# Patient Record
Sex: FEMALE | Race: WHITE | NOT HISPANIC OR LATINO
[De-identification: names, ages, dates, MRNs, and addresses within clinical notes are randomized per-mention and may not be internally consistent; named-entity substitution may affect disease eponyms.]

---

## 2017-12-07 ENCOUNTER — APPOINTMENT (OUTPATIENT)
Dept: ORTHOPEDIC SURGERY | Facility: CLINIC | Age: 52
End: 2017-12-07
Payer: MEDICARE

## 2017-12-07 VITALS — DIASTOLIC BLOOD PRESSURE: 87 MMHG | HEART RATE: 90 BPM | SYSTOLIC BLOOD PRESSURE: 139 MMHG

## 2017-12-07 VITALS — HEIGHT: 63 IN | BODY MASS INDEX: 32.78 KG/M2 | WEIGHT: 185 LBS

## 2017-12-07 DIAGNOSIS — G89.29 LUMBAGO WITH SCIATICA, LEFT SIDE: ICD-10-CM

## 2017-12-07 DIAGNOSIS — M54.42 LUMBAGO WITH SCIATICA, LEFT SIDE: ICD-10-CM

## 2017-12-07 DIAGNOSIS — F11.20 OPIOID DEPENDENCE, UNCOMPLICATED: ICD-10-CM

## 2017-12-07 DIAGNOSIS — Z87.09 PERSONAL HISTORY OF OTHER DISEASES OF THE RESPIRATORY SYSTEM: ICD-10-CM

## 2017-12-07 DIAGNOSIS — G89.4 CHRONIC PAIN SYNDROME: ICD-10-CM

## 2017-12-07 DIAGNOSIS — M54.41 LUMBAGO WITH SCIATICA, LEFT SIDE: ICD-10-CM

## 2017-12-07 PROCEDURE — 99204 OFFICE O/P NEW MOD 45 MIN: CPT

## 2017-12-07 PROCEDURE — 72100 X-RAY EXAM L-S SPINE 2/3 VWS: CPT

## 2017-12-07 RX ORDER — ZOLPIDEM TARTRATE 5 MG/1
TABLET ORAL
Refills: 0 | Status: ACTIVE | COMMUNITY

## 2017-12-07 RX ORDER — SUMATRIPTAN 20 MG/1
SPRAY NASAL
Refills: 0 | Status: ACTIVE | COMMUNITY

## 2017-12-07 RX ORDER — ACETAMINOPHEN 500 MG/1
TABLET ORAL
Refills: 0 | Status: ACTIVE | COMMUNITY

## 2017-12-07 RX ORDER — FAMOTIDINE 10 MG/1
TABLET, FILM COATED ORAL
Refills: 0 | Status: ACTIVE | COMMUNITY

## 2017-12-28 ENCOUNTER — APPOINTMENT (OUTPATIENT)
Dept: ORTHOPEDIC SURGERY | Facility: CLINIC | Age: 52
End: 2017-12-28

## 2018-02-06 ENCOUNTER — APPOINTMENT (OUTPATIENT)
Dept: SURGERY | Facility: CLINIC | Age: 53
End: 2018-02-06
Payer: MEDICARE

## 2018-02-06 VITALS
WEIGHT: 196.5 LBS | OXYGEN SATURATION: 98 % | SYSTOLIC BLOOD PRESSURE: 119 MMHG | HEIGHT: 62 IN | BODY MASS INDEX: 36.16 KG/M2 | DIASTOLIC BLOOD PRESSURE: 76 MMHG | HEART RATE: 67 BPM | TEMPERATURE: 97.6 F

## 2018-02-06 DIAGNOSIS — Z78.9 OTHER SPECIFIED HEALTH STATUS: ICD-10-CM

## 2018-02-06 PROCEDURE — 99204 OFFICE O/P NEW MOD 45 MIN: CPT

## 2018-07-10 ENCOUNTER — APPOINTMENT (OUTPATIENT)
Dept: SURGERY | Facility: CLINIC | Age: 53
End: 2018-07-10

## 2018-08-14 ENCOUNTER — APPOINTMENT (OUTPATIENT)
Dept: SURGERY | Facility: CLINIC | Age: 53
End: 2018-08-14
Payer: MEDICARE

## 2018-08-14 VITALS
TEMPERATURE: 97.9 F | SYSTOLIC BLOOD PRESSURE: 113 MMHG | OXYGEN SATURATION: 98 % | HEIGHT: 62 IN | BODY MASS INDEX: 36.25 KG/M2 | HEART RATE: 73 BPM | WEIGHT: 197 LBS | DIASTOLIC BLOOD PRESSURE: 79 MMHG

## 2018-08-14 DIAGNOSIS — Z72.3 LACK OF PHYSICAL EXERCISE: ICD-10-CM

## 2018-08-14 DIAGNOSIS — Z60.2 PROBLEMS RELATED TO LIVING ALONE: ICD-10-CM

## 2018-08-14 DIAGNOSIS — Z78.9 OTHER SPECIFIED HEALTH STATUS: ICD-10-CM

## 2018-08-14 PROCEDURE — 99213 OFFICE O/P EST LOW 20 MIN: CPT

## 2018-08-14 SDOH — SOCIAL STABILITY - SOCIAL INSECURITY: PROBLEMS RELATED TO LIVING ALONE: Z60.2

## 2018-09-17 ENCOUNTER — EMERGENCY (EMERGENCY)
Facility: HOSPITAL | Age: 53
LOS: 1 days | Discharge: HOME | End: 2018-09-17
Attending: STUDENT IN AN ORGANIZED HEALTH CARE EDUCATION/TRAINING PROGRAM
Payer: MEDICARE

## 2018-09-17 VITALS
DIASTOLIC BLOOD PRESSURE: 72 MMHG | OXYGEN SATURATION: 98 % | HEART RATE: 85 BPM | SYSTOLIC BLOOD PRESSURE: 109 MMHG | TEMPERATURE: 98 F | RESPIRATION RATE: 18 BRPM

## 2018-09-17 VITALS
OXYGEN SATURATION: 99 % | SYSTOLIC BLOOD PRESSURE: 112 MMHG | TEMPERATURE: 98 F | HEART RATE: 88 BPM | RESPIRATION RATE: 18 BRPM | DIASTOLIC BLOOD PRESSURE: 72 MMHG

## 2018-09-17 LAB
APPEARANCE UR: CLEAR — SIGNIFICANT CHANGE UP
BILIRUB UR-MCNC: NEGATIVE — SIGNIFICANT CHANGE UP
COLOR SPEC: YELLOW — SIGNIFICANT CHANGE UP
DIFF PNL FLD: ABNORMAL
GLUCOSE UR QL: NEGATIVE — SIGNIFICANT CHANGE UP
KETONES UR-MCNC: NEGATIVE — SIGNIFICANT CHANGE UP
LEUKOCYTE ESTERASE UR-ACNC: ABNORMAL
NITRITE UR-MCNC: NEGATIVE — SIGNIFICANT CHANGE UP
PH UR: 7 — SIGNIFICANT CHANGE UP (ref 5–8)
PROT UR-MCNC: 15
SP GR SPEC: 1.01 — SIGNIFICANT CHANGE UP (ref 1.01–1.02)
UROBILINOGEN FLD QL: NEGATIVE — SIGNIFICANT CHANGE UP

## 2018-09-17 PROCEDURE — 29515 APPLICATION SHORT LEG SPLINT: CPT

## 2018-09-17 PROCEDURE — 73630 X-RAY EXAM OF FOOT: CPT

## 2018-09-17 PROCEDURE — 73610 X-RAY EXAM OF ANKLE: CPT

## 2018-09-17 PROCEDURE — 73610 X-RAY EXAM OF ANKLE: CPT | Mod: 26,RT

## 2018-09-17 PROCEDURE — 73562 X-RAY EXAM OF KNEE 3: CPT | Mod: 26,RT

## 2018-09-17 PROCEDURE — 87086 URINE CULTURE/COLONY COUNT: CPT

## 2018-09-17 PROCEDURE — 99284 EMERGENCY DEPT VISIT MOD MDM: CPT | Mod: 25

## 2018-09-17 PROCEDURE — 87186 SC STD MICRODIL/AGAR DIL: CPT

## 2018-09-17 PROCEDURE — 29515 APPLICATION SHORT LEG SPLINT: CPT | Mod: RT

## 2018-09-17 PROCEDURE — 81001 URINALYSIS AUTO W/SCOPE: CPT

## 2018-09-17 PROCEDURE — 73562 X-RAY EXAM OF KNEE 3: CPT

## 2018-09-17 PROCEDURE — 73630 X-RAY EXAM OF FOOT: CPT | Mod: 26,RT

## 2018-09-17 RX ORDER — CEPHALEXIN 500 MG
500 CAPSULE ORAL ONCE
Qty: 0 | Refills: 0 | Status: COMPLETED | OUTPATIENT
Start: 2018-09-17 | End: 2018-09-17

## 2018-09-17 RX ORDER — CEPHALEXIN 500 MG
1 CAPSULE ORAL
Qty: 14 | Refills: 0 | OUTPATIENT
Start: 2018-09-17 | End: 2018-09-23

## 2018-09-17 RX ADMIN — Medication 500 MILLIGRAM(S): at 20:32

## 2018-09-17 NOTE — ED PROVIDER NOTE - OBJECTIVE STATEMENT
53 y/o F with PMHx of HTN and no significant PSHx presents to the ED with complaints of right foot pain s/p fall. Patient fell yesterday and was seen at urgent care. Patient had X-ray done at that time and orthopedic shoe placed. Patient states she was advised to present to ED for further evaluation yesterday. Patient reports unrelated urinary frequency and dysuria. Denies numbness, weakness, tingling or any other complaints. NKDA. Patient states she last took medications for pain last night.

## 2018-09-17 NOTE — ED PROVIDER NOTE - CARE PLAN
Principal Discharge DX:	Urinary tract infection without hematuria, site unspecified Principal Discharge DX:	Closed nondisplaced fracture of fifth metatarsal bone of right foot, initial encounter

## 2018-09-17 NOTE — ED ADULT NURSE NOTE - OBJECTIVE STATEMENT
pt is here for s/p fall.  c/o right foot pain 8/10, pt stated that pt fell down, right pain foot pain, denied LOC, denied dizziness or sob, pt calm at this time, c/o frequent urination at this time.

## 2018-09-17 NOTE — ED PROVIDER NOTE - PRINCIPAL DIAGNOSIS
Urinary tract infection without hematuria, site unspecified Closed nondisplaced fracture of fifth metatarsal bone of right foot, initial encounter

## 2018-09-17 NOTE — ED PROVIDER NOTE - MEDICAL DECISION MAKING DETAILS
51 y/o F presents with right foot pain. Patient is ambulatory in the ED and neurovascularly intact, Will obtain X-ray of the foot, ankle and knee to rule out fracture or dislocation.

## 2018-09-19 ENCOUNTER — OTHER (OUTPATIENT)
Age: 53
End: 2018-09-19

## 2018-10-19 ENCOUNTER — APPOINTMENT (OUTPATIENT)
Dept: RADIOLOGY | Facility: HOSPITAL | Age: 53
End: 2018-10-19

## 2018-11-12 ENCOUNTER — OUTPATIENT (OUTPATIENT)
Dept: OUTPATIENT SERVICES | Facility: HOSPITAL | Age: 53
LOS: 1 days | End: 2018-11-12
Payer: MEDICARE

## 2018-11-12 ENCOUNTER — APPOINTMENT (OUTPATIENT)
Dept: RADIOLOGY | Facility: HOSPITAL | Age: 53
End: 2018-11-12
Payer: MEDICARE

## 2018-11-12 PROCEDURE — 74241: CPT

## 2018-11-12 PROCEDURE — 74241: CPT | Mod: 26

## 2018-11-29 ENCOUNTER — APPOINTMENT (OUTPATIENT)
Dept: SURGERY | Facility: CLINIC | Age: 53
End: 2018-11-29
Payer: MEDICARE

## 2018-11-29 PROCEDURE — ZZZZZ: CPT

## 2019-01-22 ENCOUNTER — OUTPATIENT (OUTPATIENT)
Dept: OUTPATIENT SERVICES | Facility: HOSPITAL | Age: 54
LOS: 1 days | End: 2019-01-22
Payer: MEDICARE

## 2019-01-22 DIAGNOSIS — Z01.818 ENCOUNTER FOR OTHER PREPROCEDURAL EXAMINATION: ICD-10-CM

## 2019-01-22 LAB
ALBUMIN SERPL ELPH-MCNC: 4.3 G/DL — SIGNIFICANT CHANGE UP (ref 3.3–5)
ALP SERPL-CCNC: 78 U/L — SIGNIFICANT CHANGE UP (ref 40–120)
ALT FLD-CCNC: 14 U/L — SIGNIFICANT CHANGE UP (ref 10–45)
ANION GAP SERPL CALC-SCNC: 11 MMOL/L — SIGNIFICANT CHANGE UP (ref 5–17)
APTT BLD: 26.6 SEC — LOW (ref 27.5–36.3)
AST SERPL-CCNC: 13 U/L — SIGNIFICANT CHANGE UP (ref 10–40)
BASOPHILS NFR BLD AUTO: 0.1 % — SIGNIFICANT CHANGE UP (ref 0–2)
BILIRUB SERPL-MCNC: 0.3 MG/DL — SIGNIFICANT CHANGE UP (ref 0.2–1.2)
BUN SERPL-MCNC: 10 MG/DL — SIGNIFICANT CHANGE UP (ref 7–23)
CALCIUM SERPL-MCNC: 9.4 MG/DL — SIGNIFICANT CHANGE UP (ref 8.4–10.5)
CHLORIDE SERPL-SCNC: 108 MMOL/L — SIGNIFICANT CHANGE UP (ref 96–108)
CO2 SERPL-SCNC: 25 MMOL/L — SIGNIFICANT CHANGE UP (ref 22–31)
CREAT SERPL-MCNC: 0.61 MG/DL — SIGNIFICANT CHANGE UP (ref 0.5–1.3)
EOSINOPHIL NFR BLD AUTO: 0.1 % — SIGNIFICANT CHANGE UP (ref 0–6)
GLUCOSE SERPL-MCNC: 96 MG/DL — SIGNIFICANT CHANGE UP (ref 70–99)
HCT VFR BLD CALC: 40.3 % — SIGNIFICANT CHANGE UP (ref 34.5–45)
HGB BLD-MCNC: 13.5 G/DL — SIGNIFICANT CHANGE UP (ref 11.5–15.5)
INR BLD: 1.07 — SIGNIFICANT CHANGE UP (ref 0.88–1.16)
LYMPHOCYTES # BLD AUTO: 18.5 % — SIGNIFICANT CHANGE UP (ref 13–44)
MCHC RBC-ENTMCNC: 30.1 PG — SIGNIFICANT CHANGE UP (ref 27–34)
MCHC RBC-ENTMCNC: 33.5 G/DL — SIGNIFICANT CHANGE UP (ref 32–36)
MCV RBC AUTO: 90 FL — SIGNIFICANT CHANGE UP (ref 80–100)
MONOCYTES NFR BLD AUTO: 7 % — SIGNIFICANT CHANGE UP (ref 2–14)
NEUTROPHILS NFR BLD AUTO: 74.3 % — SIGNIFICANT CHANGE UP (ref 43–77)
PLATELET # BLD AUTO: 342 K/UL — SIGNIFICANT CHANGE UP (ref 150–400)
POTASSIUM SERPL-MCNC: 4.5 MMOL/L — SIGNIFICANT CHANGE UP (ref 3.5–5.3)
POTASSIUM SERPL-SCNC: 4.5 MMOL/L — SIGNIFICANT CHANGE UP (ref 3.5–5.3)
PROT SERPL-MCNC: 6.9 G/DL — SIGNIFICANT CHANGE UP (ref 6–8.3)
PROTHROM AB SERPL-ACNC: 12.1 SEC — SIGNIFICANT CHANGE UP (ref 10–12.9)
RBC # BLD: 4.48 M/UL — SIGNIFICANT CHANGE UP (ref 3.8–5.2)
RBC # FLD: 12.4 % — SIGNIFICANT CHANGE UP (ref 10.3–16.9)
SODIUM SERPL-SCNC: 144 MMOL/L — SIGNIFICANT CHANGE UP (ref 135–145)
WBC # BLD: 10.8 K/UL — HIGH (ref 3.8–10.5)
WBC # FLD AUTO: 10.8 K/UL — HIGH (ref 3.8–10.5)

## 2019-01-22 PROCEDURE — 85025 COMPLETE CBC W/AUTO DIFF WBC: CPT

## 2019-01-22 PROCEDURE — 71046 X-RAY EXAM CHEST 2 VIEWS: CPT | Mod: 26

## 2019-01-22 PROCEDURE — 71046 X-RAY EXAM CHEST 2 VIEWS: CPT

## 2019-01-22 PROCEDURE — 85730 THROMBOPLASTIN TIME PARTIAL: CPT

## 2019-01-22 PROCEDURE — 85610 PROTHROMBIN TIME: CPT

## 2019-01-22 PROCEDURE — 80053 COMPREHEN METABOLIC PANEL: CPT

## 2019-01-23 VITALS
RESPIRATION RATE: 16 BRPM | OXYGEN SATURATION: 98 % | WEIGHT: 200.18 LBS | SYSTOLIC BLOOD PRESSURE: 101 MMHG | HEIGHT: 63 IN | HEART RATE: 74 BPM | DIASTOLIC BLOOD PRESSURE: 61 MMHG | TEMPERATURE: 97 F

## 2019-01-23 NOTE — PATIENT PROFILE ADULT - NSPROGENSOURCEINFO_GEN_A_NUR
DISPLAY PLAN FREE TEXT
patient

## 2019-01-24 ENCOUNTER — INPATIENT (INPATIENT)
Facility: HOSPITAL | Age: 54
LOS: 1 days | Discharge: HOME | DRG: 621 | End: 2019-01-26
Attending: SURGERY | Admitting: SURGERY
Payer: MEDICARE

## 2019-01-24 ENCOUNTER — APPOINTMENT (OUTPATIENT)
Age: 54
End: 2019-01-24

## 2019-01-24 DIAGNOSIS — Z98.891 HISTORY OF UTERINE SCAR FROM PREVIOUS SURGERY: Chronic | ICD-10-CM

## 2019-01-24 DIAGNOSIS — Z41.9 ENCOUNTER FOR PROCEDURE FOR PURPOSES OTHER THAN REMEDYING HEALTH STATE, UNSPECIFIED: Chronic | ICD-10-CM

## 2019-01-24 LAB
BASOPHILS NFR BLD AUTO: 0.1 % — SIGNIFICANT CHANGE UP (ref 0–2)
HCT VFR BLD CALC: 37 % — SIGNIFICANT CHANGE UP (ref 34.5–45)
HGB BLD-MCNC: 12.2 G/DL — SIGNIFICANT CHANGE UP (ref 11.5–15.5)
LYMPHOCYTES # BLD AUTO: 7.9 % — LOW (ref 13–44)
MCHC RBC-ENTMCNC: 30 PG — SIGNIFICANT CHANGE UP (ref 27–34)
MCHC RBC-ENTMCNC: 33 G/DL — SIGNIFICANT CHANGE UP (ref 32–36)
MCV RBC AUTO: 90.9 FL — SIGNIFICANT CHANGE UP (ref 80–100)
MONOCYTES NFR BLD AUTO: 7 % — SIGNIFICANT CHANGE UP (ref 2–14)
NEUTROPHILS NFR BLD AUTO: 85 % — HIGH (ref 43–77)
PLATELET # BLD AUTO: 279 K/UL — SIGNIFICANT CHANGE UP (ref 150–400)
RBC # BLD: 4.07 M/UL — SIGNIFICANT CHANGE UP (ref 3.8–5.2)
RBC # FLD: 12.6 % — SIGNIFICANT CHANGE UP (ref 10.3–16.9)
WBC # BLD: 12.8 K/UL — HIGH (ref 3.8–10.5)
WBC # FLD AUTO: 12.8 K/UL — HIGH (ref 3.8–10.5)

## 2019-01-24 PROCEDURE — 43644 LAP GASTRIC BYPASS/ROUX-EN-Y: CPT | Mod: 80

## 2019-01-24 PROCEDURE — 43645 LAP GASTR BYPASS INCL SMLL I: CPT | Mod: GC

## 2019-01-24 PROCEDURE — 43645 LAP GASTR BYPASS INCL SMLL I: CPT | Mod: 80

## 2019-01-24 RX ORDER — ENOXAPARIN SODIUM 100 MG/ML
40 INJECTION SUBCUTANEOUS EVERY 24 HOURS
Qty: 0 | Refills: 0 | Status: DISCONTINUED | OUTPATIENT
Start: 2019-01-24 | End: 2019-01-26

## 2019-01-24 RX ORDER — METOCLOPRAMIDE HCL 10 MG
10 TABLET ORAL ONCE
Qty: 0 | Refills: 0 | Status: DISCONTINUED | OUTPATIENT
Start: 2019-01-24 | End: 2019-01-26

## 2019-01-24 RX ORDER — HYDROMORPHONE HYDROCHLORIDE 2 MG/ML
1 INJECTION INTRAMUSCULAR; INTRAVENOUS; SUBCUTANEOUS
Qty: 0 | Refills: 0 | Status: DISCONTINUED | OUTPATIENT
Start: 2019-01-24 | End: 2019-01-26

## 2019-01-24 RX ORDER — PANTOPRAZOLE SODIUM 20 MG/1
40 TABLET, DELAYED RELEASE ORAL EVERY 24 HOURS
Qty: 0 | Refills: 0 | Status: DISCONTINUED | OUTPATIENT
Start: 2019-01-24 | End: 2019-01-26

## 2019-01-24 RX ORDER — ONDANSETRON 8 MG/1
4 TABLET, FILM COATED ORAL EVERY 6 HOURS
Qty: 0 | Refills: 0 | Status: DISCONTINUED | OUTPATIENT
Start: 2019-01-24 | End: 2019-01-26

## 2019-01-24 RX ORDER — HYDROMORPHONE HYDROCHLORIDE 2 MG/ML
0.5 INJECTION INTRAMUSCULAR; INTRAVENOUS; SUBCUTANEOUS
Qty: 0 | Refills: 0 | Status: DISCONTINUED | OUTPATIENT
Start: 2019-01-24 | End: 2019-01-24

## 2019-01-24 RX ORDER — ENOXAPARIN SODIUM 100 MG/ML
40 INJECTION SUBCUTANEOUS ONCE
Qty: 0 | Refills: 0 | Status: COMPLETED | OUTPATIENT
Start: 2019-01-24 | End: 2019-01-24

## 2019-01-24 RX ORDER — SODIUM CHLORIDE 9 MG/ML
1000 INJECTION, SOLUTION INTRAVENOUS
Qty: 0 | Refills: 0 | Status: DISCONTINUED | OUTPATIENT
Start: 2019-01-24 | End: 2019-01-26

## 2019-01-24 RX ORDER — SCOPALAMINE 1 MG/3D
1 PATCH, EXTENDED RELEASE TRANSDERMAL ONCE
Qty: 0 | Refills: 0 | Status: COMPLETED | OUTPATIENT
Start: 2019-01-24 | End: 2019-01-24

## 2019-01-24 RX ORDER — HYDRALAZINE HCL 50 MG
10 TABLET ORAL ONCE
Qty: 0 | Refills: 0 | Status: DISCONTINUED | OUTPATIENT
Start: 2019-01-24 | End: 2019-01-24

## 2019-01-24 RX ORDER — HYDROMORPHONE HYDROCHLORIDE 2 MG/ML
0.5 INJECTION INTRAMUSCULAR; INTRAVENOUS; SUBCUTANEOUS
Qty: 0 | Refills: 0 | Status: DISCONTINUED | OUTPATIENT
Start: 2019-01-24 | End: 2019-01-26

## 2019-01-24 RX ADMIN — HYDROMORPHONE HYDROCHLORIDE 1 MILLIGRAM(S): 2 INJECTION INTRAMUSCULAR; INTRAVENOUS; SUBCUTANEOUS at 23:28

## 2019-01-24 RX ADMIN — SCOPALAMINE 1 PATCH: 1 PATCH, EXTENDED RELEASE TRANSDERMAL at 07:28

## 2019-01-24 RX ADMIN — ENOXAPARIN SODIUM 40 MILLIGRAM(S): 100 INJECTION SUBCUTANEOUS at 07:28

## 2019-01-24 RX ADMIN — HYDROMORPHONE HYDROCHLORIDE 0.5 MILLIGRAM(S): 2 INJECTION INTRAMUSCULAR; INTRAVENOUS; SUBCUTANEOUS at 13:45

## 2019-01-24 RX ADMIN — HYDROMORPHONE HYDROCHLORIDE 0.5 MILLIGRAM(S): 2 INJECTION INTRAMUSCULAR; INTRAVENOUS; SUBCUTANEOUS at 13:27

## 2019-01-24 RX ADMIN — PANTOPRAZOLE SODIUM 40 MILLIGRAM(S): 20 TABLET, DELAYED RELEASE ORAL at 13:26

## 2019-01-24 NOTE — PROGRESS NOTE ADULT - ASSESSMENT
53F PMH MO (BMI 36), HTN, HLD, TERRY (on CPAP), chronic pain on opioids s/p robotic gastric bypass    - NPO/IVF  - Pain/nausea PRN  - OOBA/SCDs/Lovenox  - Reyes  - CPAP  - UGI series 01/25

## 2019-01-24 NOTE — PROGRESS NOTE ADULT - SUBJECTIVE AND OBJECTIVE BOX
POST-OPERATIVE NOTE    Procedure: Robotic assisted gastric bypass    Diagnosis/Indication: Morbid obesity     Surgeon: Dr. Gama     S: Pt has no complaints. Denies CP, SOB, BRUNO, calf tenderness. Pain controlled with medication.    O:  T(C): 37.1 (01-24-19 @ 12:42), Max: 37.1 (01-24-19 @ 12:42)  T(F): 98.8 (01-24-19 @ 12:42), Max: 98.8 (01-24-19 @ 12:42)  HR: 102 (01-24-19 @ 15:42) (96 - 120)  BP: 98/51 (01-24-19 @ 15:42) (98/51 - 117/78)  RR: 12 (01-24-19 @ 15:42) (12 - 25)  SpO2: 96% (01-24-19 @ 15:42) (95% - 98%)  Wt(kg): --            Gen: NAD, resting comfortably in bed  C/V: NSR  Pulm: Nonlabored breathing, no respiratory distress  Abd:  Soft, non- distended, TTP around incision site, incision clean dry and intact   Extrem: WWP, no calf edema, SCDs in place

## 2019-01-24 NOTE — H&P ADULT - HISTORY OF PRESENT ILLNESS
54 yo F with morbid obesity - BMI 36 - and HTN, hypercholesterolemia, TERRY-CPAP and chronic back pain, scheduled for gastric bypass today

## 2019-01-24 NOTE — BRIEF OPERATIVE NOTE - OPERATION/FINDINGS
Pouch created over 32 Fr bougie. GJ done in two layers running with 2-0 Quill PDO outer layer and 2-0 Ethibond inner layer. Mesenteric defects closed with non-absorbable suture. BP limb 150 cm, Iván limb 150 cm.

## 2019-01-24 NOTE — BRIEF OPERATIVE NOTE - PROCEDURE
<<-----Click on this checkbox to enter Procedure Creation, bypass, gastric, robot-assisted  01/24/2019    Active  BRITTANY

## 2019-01-25 LAB
ANION GAP SERPL CALC-SCNC: 9 MMOL/L — SIGNIFICANT CHANGE UP (ref 5–17)
BASOPHILS NFR BLD AUTO: 0.1 % — SIGNIFICANT CHANGE UP (ref 0–2)
BUN SERPL-MCNC: 11 MG/DL — SIGNIFICANT CHANGE UP (ref 7–23)
CALCIUM SERPL-MCNC: 8.4 MG/DL — SIGNIFICANT CHANGE UP (ref 8.4–10.5)
CHLORIDE SERPL-SCNC: 105 MMOL/L — SIGNIFICANT CHANGE UP (ref 96–108)
CO2 SERPL-SCNC: 27 MMOL/L — SIGNIFICANT CHANGE UP (ref 22–31)
CREAT SERPL-MCNC: 0.63 MG/DL — SIGNIFICANT CHANGE UP (ref 0.5–1.3)
EOSINOPHIL NFR BLD AUTO: 0.3 % — SIGNIFICANT CHANGE UP (ref 0–6)
GLUCOSE SERPL-MCNC: 90 MG/DL — SIGNIFICANT CHANGE UP (ref 70–99)
HCT VFR BLD CALC: 37 % — SIGNIFICANT CHANGE UP (ref 34.5–45)
HGB BLD-MCNC: 11.9 G/DL — SIGNIFICANT CHANGE UP (ref 11.5–15.5)
LYMPHOCYTES # BLD AUTO: 22.7 % — SIGNIFICANT CHANGE UP (ref 13–44)
MAGNESIUM SERPL-MCNC: 2 MG/DL — SIGNIFICANT CHANGE UP (ref 1.6–2.6)
MCHC RBC-ENTMCNC: 29.5 PG — SIGNIFICANT CHANGE UP (ref 27–34)
MCHC RBC-ENTMCNC: 32.2 G/DL — SIGNIFICANT CHANGE UP (ref 32–36)
MCV RBC AUTO: 91.6 FL — SIGNIFICANT CHANGE UP (ref 80–100)
MONOCYTES NFR BLD AUTO: 8.4 % — SIGNIFICANT CHANGE UP (ref 2–14)
NEUTROPHILS NFR BLD AUTO: 68.5 % — SIGNIFICANT CHANGE UP (ref 43–77)
PHOSPHATE SERPL-MCNC: 3.2 MG/DL — SIGNIFICANT CHANGE UP (ref 2.5–4.5)
PLATELET # BLD AUTO: 242 K/UL — SIGNIFICANT CHANGE UP (ref 150–400)
POTASSIUM SERPL-MCNC: 3.8 MMOL/L — SIGNIFICANT CHANGE UP (ref 3.5–5.3)
POTASSIUM SERPL-SCNC: 3.8 MMOL/L — SIGNIFICANT CHANGE UP (ref 3.5–5.3)
RBC # BLD: 4.04 M/UL — SIGNIFICANT CHANGE UP (ref 3.8–5.2)
RBC # FLD: 13 % — SIGNIFICANT CHANGE UP (ref 10.3–16.9)
SODIUM SERPL-SCNC: 141 MMOL/L — SIGNIFICANT CHANGE UP (ref 135–145)
WBC # BLD: 9.6 K/UL — SIGNIFICANT CHANGE UP (ref 3.8–10.5)
WBC # FLD AUTO: 9.6 K/UL — SIGNIFICANT CHANGE UP (ref 3.8–10.5)

## 2019-01-25 PROCEDURE — 74241: CPT | Mod: 26

## 2019-01-25 RX ORDER — ACETAMINOPHEN 500 MG
1000 TABLET ORAL ONCE
Qty: 0 | Refills: 0 | Status: COMPLETED | OUTPATIENT
Start: 2019-01-25 | End: 2019-01-25

## 2019-01-25 RX ORDER — POTASSIUM CHLORIDE 20 MEQ
10 PACKET (EA) ORAL
Qty: 0 | Refills: 0 | Status: COMPLETED | OUTPATIENT
Start: 2019-01-25 | End: 2019-01-25

## 2019-01-25 RX ADMIN — Medication 400 MILLIGRAM(S): at 23:39

## 2019-01-25 RX ADMIN — HYDROMORPHONE HYDROCHLORIDE 1 MILLIGRAM(S): 2 INJECTION INTRAMUSCULAR; INTRAVENOUS; SUBCUTANEOUS at 08:30

## 2019-01-25 RX ADMIN — HYDROMORPHONE HYDROCHLORIDE 1 MILLIGRAM(S): 2 INJECTION INTRAMUSCULAR; INTRAVENOUS; SUBCUTANEOUS at 18:30

## 2019-01-25 RX ADMIN — HYDROMORPHONE HYDROCHLORIDE 1 MILLIGRAM(S): 2 INJECTION INTRAMUSCULAR; INTRAVENOUS; SUBCUTANEOUS at 18:00

## 2019-01-25 RX ADMIN — ONDANSETRON 4 MILLIGRAM(S): 8 TABLET, FILM COATED ORAL at 12:19

## 2019-01-25 RX ADMIN — Medication 100 MILLIEQUIVALENT(S): at 15:59

## 2019-01-25 RX ADMIN — Medication 100 MILLIEQUIVALENT(S): at 14:41

## 2019-01-25 RX ADMIN — ONDANSETRON 4 MILLIGRAM(S): 8 TABLET, FILM COATED ORAL at 18:00

## 2019-01-25 RX ADMIN — HYDROMORPHONE HYDROCHLORIDE 1 MILLIGRAM(S): 2 INJECTION INTRAMUSCULAR; INTRAVENOUS; SUBCUTANEOUS at 12:45

## 2019-01-25 RX ADMIN — ENOXAPARIN SODIUM 40 MILLIGRAM(S): 100 INJECTION SUBCUTANEOUS at 08:26

## 2019-01-25 RX ADMIN — HYDROMORPHONE HYDROCHLORIDE 1 MILLIGRAM(S): 2 INJECTION INTRAMUSCULAR; INTRAVENOUS; SUBCUTANEOUS at 07:55

## 2019-01-25 RX ADMIN — SODIUM CHLORIDE 125 MILLILITER(S): 9 INJECTION, SOLUTION INTRAVENOUS at 10:29

## 2019-01-25 RX ADMIN — SODIUM CHLORIDE 125 MILLILITER(S): 9 INJECTION, SOLUTION INTRAVENOUS at 23:38

## 2019-01-25 RX ADMIN — Medication 100 MILLIEQUIVALENT(S): at 12:19

## 2019-01-25 RX ADMIN — SCOPALAMINE 1 PATCH: 1 PATCH, EXTENDED RELEASE TRANSDERMAL at 19:20

## 2019-01-25 RX ADMIN — PANTOPRAZOLE SODIUM 40 MILLIGRAM(S): 20 TABLET, DELAYED RELEASE ORAL at 12:27

## 2019-01-25 RX ADMIN — HYDROMORPHONE HYDROCHLORIDE 1 MILLIGRAM(S): 2 INJECTION INTRAMUSCULAR; INTRAVENOUS; SUBCUTANEOUS at 12:19

## 2019-01-25 RX ADMIN — SODIUM CHLORIDE 125 MILLILITER(S): 9 INJECTION, SOLUTION INTRAVENOUS at 18:00

## 2019-01-25 NOTE — PROGRESS NOTE ADULT - ASSESSMENT
A/P: 53F PMH MO (BMI 36), HTN, HLD, TERRY (on CPAP), chronic pain on opioids s/p robotic gastric bypass 01/24. Patient will go for UGI series today, and possible advancement of diet depending on results.    - NPO/IVF  - Pain/nausea PRN  - OOBA/SCDs/Lovenox  - Reyes  - CPAP  - UGI series 01/25

## 2019-01-25 NOTE — DIETITIAN INITIAL EVALUATION ADULT. - OTHER INFO
52yo F s/p RYGB POD1. Now s/p UGI earlier this am. Pt seen resting in bed. Currently NPO. Unable to assess intake/PO tolerance at this time. Pain and nausea controlled. Prepared w/ protein supplements. Has yet to get vitamins. RD provided indepth edu on diet advancement and specific nutrient needs s/p RYGB. Written edu provided in [place] per pt preference. NKFA or dietary restrictions. Skin: surgical incision; GI WDL per flowsheet. Understanding of nutrition services for future questions/concerns.

## 2019-01-25 NOTE — PROGRESS NOTE ADULT - SUBJECTIVE AND OBJECTIVE BOX
ID: 53F PMH MO (BMI 36), HTN, HLD, TERRY (on CPAP), chronic pain on opioids s/p robotic gastric bypass 01/24      SUBJECTIVE: Patient denies nausea/vomiting. Patient endorses flatus.      OBJECTIVE:    Vital Signs:  Vital Signs Last 24 Hrs  T(C): 36 (25 Jan 2019 05:35), Max: 37.1 (24 Jan 2019 12:42)  T(F): 96.8 (25 Jan 2019 05:35), Max: 98.8 (24 Jan 2019 12:42)  HR: 74 (25 Jan 2019 05:35) (74 - 120)  BP: 110/68 (25 Jan 2019 05:35) (91/57 - 117/78)  BP(mean): 62 (24 Jan 2019 17:12) (62 - 100)  RR: 17 (25 Jan 2019 05:35) (12 - 25)  SpO2: 96% (25 Jan 2019 05:35) (93% - 98%)    Physical Exam:  General: NAD  Pulmonary: Nonlabored breathing, no respiratory distress  Cardiovascular: No heaves/thrills  Abdominal: Soft, appropriately tender, nondistended, incisions CDI  Extremities: WWP, no clubbing/cyanosis/edema  Neuro: AAO x3, no focal deficits    Lines/Drains/Tubes:    Ins and Outs:  I&O's Summary    24 Jan 2019 07:01  -  25 Jan 2019 07:00  --------------------------------------------------------  IN: 2250 mL / OUT: 500 mL / NET: 1750 mL        Labs:                        12.2   12.8  )-----------( 279      ( 24 Jan 2019 18:06 )             37.0               CAPILLARY BLOOD GLUCOSE            Radiology & Additional Studies:

## 2019-01-25 NOTE — DIETITIAN INITIAL EVALUATION ADULT. - ENERGY NEEDS
Height: 5'3" Weight: 200lbs, IBW 115lbs+/-10%, %%, BMI 35.5  IBW used for calculations as pt >120% of IBW   Above energy needs calculated for wt maintenance (20-25kcal/kg).   Weeks 1-2 estimated needs: 523-627kcal/day (10-12kcal/kg), 78-110g pro/day (1.5-2.1g/kg), >/=64oz clear fluids.

## 2019-01-26 ENCOUNTER — TRANSCRIPTION ENCOUNTER (OUTPATIENT)
Age: 54
End: 2019-01-26

## 2019-01-26 VITALS — OXYGEN SATURATION: 96 % | RESPIRATION RATE: 16 BRPM | HEART RATE: 77 BPM

## 2019-01-26 LAB
ANION GAP SERPL CALC-SCNC: 7 MMOL/L — SIGNIFICANT CHANGE UP (ref 5–17)
BUN SERPL-MCNC: 8 MG/DL — SIGNIFICANT CHANGE UP (ref 7–23)
CALCIUM SERPL-MCNC: 8.7 MG/DL — SIGNIFICANT CHANGE UP (ref 8.4–10.5)
CHLORIDE SERPL-SCNC: 102 MMOL/L — SIGNIFICANT CHANGE UP (ref 96–108)
CO2 SERPL-SCNC: 28 MMOL/L — SIGNIFICANT CHANGE UP (ref 22–31)
CREAT SERPL-MCNC: 0.62 MG/DL — SIGNIFICANT CHANGE UP (ref 0.5–1.3)
GLUCOSE SERPL-MCNC: 72 MG/DL — SIGNIFICANT CHANGE UP (ref 70–99)
HCT VFR BLD CALC: 35.1 % — SIGNIFICANT CHANGE UP (ref 34.5–45)
HGB BLD-MCNC: 11.7 G/DL — SIGNIFICANT CHANGE UP (ref 11.5–15.5)
MAGNESIUM SERPL-MCNC: 1.9 MG/DL — SIGNIFICANT CHANGE UP (ref 1.6–2.6)
MCHC RBC-ENTMCNC: 29.8 PG — SIGNIFICANT CHANGE UP (ref 27–34)
MCHC RBC-ENTMCNC: 33.3 G/DL — SIGNIFICANT CHANGE UP (ref 32–36)
MCV RBC AUTO: 89.3 FL — SIGNIFICANT CHANGE UP (ref 80–100)
PHOSPHATE SERPL-MCNC: 2.8 MG/DL — SIGNIFICANT CHANGE UP (ref 2.5–4.5)
PLATELET # BLD AUTO: 233 K/UL — SIGNIFICANT CHANGE UP (ref 150–400)
POTASSIUM SERPL-MCNC: 3.9 MMOL/L — SIGNIFICANT CHANGE UP (ref 3.5–5.3)
POTASSIUM SERPL-SCNC: 3.9 MMOL/L — SIGNIFICANT CHANGE UP (ref 3.5–5.3)
RBC # BLD: 3.93 M/UL — SIGNIFICANT CHANGE UP (ref 3.8–5.2)
RBC # FLD: 12.6 % — SIGNIFICANT CHANGE UP (ref 10.3–16.9)
SODIUM SERPL-SCNC: 137 MMOL/L — SIGNIFICANT CHANGE UP (ref 135–145)
WBC # BLD: 8.3 K/UL — SIGNIFICANT CHANGE UP (ref 3.8–10.5)
WBC # FLD AUTO: 8.3 K/UL — SIGNIFICANT CHANGE UP (ref 3.8–10.5)

## 2019-01-26 PROCEDURE — 85027 COMPLETE CBC AUTOMATED: CPT

## 2019-01-26 PROCEDURE — 80048 BASIC METABOLIC PNL TOTAL CA: CPT

## 2019-01-26 PROCEDURE — 85025 COMPLETE CBC W/AUTO DIFF WBC: CPT

## 2019-01-26 PROCEDURE — 83735 ASSAY OF MAGNESIUM: CPT

## 2019-01-26 PROCEDURE — 74241: CPT

## 2019-01-26 PROCEDURE — 86901 BLOOD TYPING SEROLOGIC RH(D): CPT

## 2019-01-26 PROCEDURE — 84100 ASSAY OF PHOSPHORUS: CPT

## 2019-01-26 PROCEDURE — 86850 RBC ANTIBODY SCREEN: CPT

## 2019-01-26 PROCEDURE — 36415 COLL VENOUS BLD VENIPUNCTURE: CPT

## 2019-01-26 PROCEDURE — 86900 BLOOD TYPING SEROLOGIC ABO: CPT

## 2019-01-26 PROCEDURE — S2900: CPT

## 2019-01-26 RX ORDER — PANTOPRAZOLE SODIUM 20 MG/1
1 TABLET, DELAYED RELEASE ORAL
Qty: 30 | Refills: 0 | OUTPATIENT
Start: 2019-01-26 | End: 2019-02-24

## 2019-01-26 RX ORDER — POTASSIUM CHLORIDE 20 MEQ
10 PACKET (EA) ORAL ONCE
Qty: 0 | Refills: 0 | Status: DISCONTINUED | OUTPATIENT
Start: 2019-01-26 | End: 2019-01-26

## 2019-01-26 RX ORDER — MAGNESIUM SULFATE 500 MG/ML
1 VIAL (ML) INJECTION ONCE
Qty: 0 | Refills: 0 | Status: COMPLETED | OUTPATIENT
Start: 2019-01-26 | End: 2019-01-26

## 2019-01-26 RX ADMIN — ENOXAPARIN SODIUM 40 MILLIGRAM(S): 100 INJECTION SUBCUTANEOUS at 08:50

## 2019-01-26 RX ADMIN — Medication 100 GRAM(S): at 12:01

## 2019-01-26 RX ADMIN — PANTOPRAZOLE SODIUM 40 MILLIGRAM(S): 20 TABLET, DELAYED RELEASE ORAL at 13:24

## 2019-01-26 RX ADMIN — Medication 1000 MILLIGRAM(S): at 00:09

## 2019-01-26 NOTE — DISCHARGE NOTE ADULT - PLAN OF CARE
recovery Follow up with  in 1 week. Call the office at  to schedule your appointment. You may shower; soap and water over incision sites. Do not scrub. Pat dry when done. No tub bathing or swimming until cleared. Keep incision sites out of the sun as scars will darken. No heavy lifting (>10lbs) or strenuous exercise. Diet: Bariatric Full Fluids. 60 grams protein daily.  64 fluid ounces water daily. Drink small sips throughout the day. Continue diet as outlined by paperwork received as a pre-operative patient. You should be urinating at least 3-4x per day. Call the office if you experience increasing abdominal pain, nausea, vomiting, or temperature >100.4F.  NO ASPIRIN OR NSAIDs until approved by Dr. Gama. Avoid alcoholic beverages until cleared by Dr. Gama.

## 2019-01-26 NOTE — DISCHARGE NOTE ADULT - HOSPITAL COURSE
53F PMH MO (BMI 36), HTN, HLD, TERRY (on CPAP), chronic pain on opioids presents to [facility] for robotic gastric bypass. Patient's post-operative course was uncomplicated. UGI POD 2 was within normal limits and patient was advanced to bariatric clears. Reyes was discontinued POD2 and patient passed trial of void. Patient tolerated bariatric clear liquids and pain was well controlled on medication. On day of discharge, pt deemed stable and ready to return home with plan to follow up as an outpatient.

## 2019-01-26 NOTE — DISCHARGE NOTE ADULT - ADDITIONAL INSTRUCTIONS
1) Take 2 tabs tylenol every 6 hours as needed for pain management. You have also been prescribed percocet for pain not controlled by tylenol. Take 2 tabs as prescribed instead of tylenol for severe pain.   2) Continue taking protonix as prescribed to prevent acid reflux

## 2019-01-26 NOTE — PROGRESS NOTE ADULT - ASSESSMENT
53F PMH MO (BMI 36), HTN, HLD, TERRY (on CPAP), chronic pain on opioids s/p robotic gastric bypass 01/24    - BCLD/IVF  - Pain/nausea PRN  - OOBA/SCDs/Lovenox  - Voids  - CPAP  - dc home today

## 2019-01-26 NOTE — DISCHARGE NOTE ADULT - PATIENT PORTAL LINK FT
You can access the [facility] Patient Portal, offered by [facility], by registering with the following website: [email or web address]

## 2019-01-26 NOTE — DISCHARGE NOTE ADULT - CARE PLAN
Principal Discharge DX:	Morbid obesity  Goal:	recovery  Assessment and plan of treatment:	Follow up with  in 1 week. Call the office at  to schedule your appointment. You may shower; soap and water over incision sites. Do not scrub. Pat dry when done. No tub bathing or swimming until cleared. Keep incision sites out of the sun as scars will darken. No heavy lifting (>10lbs) or strenuous exercise. Diet: Bariatric Full Fluids. 60 grams protein daily.  64 fluid ounces water daily. Drink small sips throughout the day. Continue diet as outlined by paperwork received as a pre-operative patient. You should be urinating at least 3-4x per day. Call the office if you experience increasing abdominal pain, nausea, vomiting, or temperature >100.4F.  NO ASPIRIN OR NSAIDs until approved by Dr. Gama. Avoid alcoholic beverages until cleared by Dr. Gama.

## 2019-01-26 NOTE — DISCHARGE NOTE ADULT - MEDICATION SUMMARY - MEDICATIONS TO TAKE
I will START or STAY ON the medications listed below when I get home from the hospital:    Percocet 5/325 oral tablet  -- 2 tab(s) by mouth every 6 hours x 3 days, As Needed  -for severe pain MDD:8   -- Caution federal law prohibits the transfer of this drug to any person other  than the person for whom it was prescribed.  May cause drowsiness.  Alcohol may intensify this effect.  Use care when operating dangerous machinery.  This prescription cannot be refilled.  This product contains acetaminophen.  Do not use  with any other product containing acetaminophen to prevent possible liver damage.  Using more of this medication than prescribed may cause serious breathing problems.    -- Indication: For Pain    gabapentin  --  by mouth   -- Indication: For Home med    Keflex 500 mg oral capsule  -- 1 cap(s) by mouth every 12 hours   -- Finish all this medication unless otherwise directed by prescriber.    -- Indication: For Home med    baclofen  --  by mouth   -- Indication: For Home med    Protonix 40 mg oral delayed release tablet  -- 1 tab(s) by mouth once a day   -- It is very important that you take or use this exactly as directed.  Do not skip doses or discontinue unless directed by your doctor.  Obtain medical advice before taking any non-prescription drugs as some may affect the action of this medication.  Swallow whole.  Do not crush.    -- Indication: For Antacid

## 2019-01-26 NOTE — PROGRESS NOTE ADULT - SUBJECTIVE AND OBJECTIVE BOX
INTERVAL HPI/OVERNIGHT EVENTS: ZAHRA    STATUS POST:  gastric bypass    POST OPERATIVE DAY #: 2    SUBJECTIVE:  pt seen at bedside, feeling well.    MEDICATIONS  (STANDING):  enoxaparin Injectable 40 milliGRAM(s) SubCutaneous every 24 hours  lactated ringers. 1000 milliLiter(s) (125 mL/Hr) IV Continuous <Continuous>  magnesium sulfate  IVPB 1 Gram(s) IV Intermittent once  pantoprazole  Injectable 40 milliGRAM(s) IV Push every 24 hours  potassium chloride  10 mEq/100 mL IVPB 10 milliEquivalent(s) IV Intermittent once    MEDICATIONS  (PRN):  HYDROmorphone  Injectable 0.5 milliGRAM(s) IV Push every 3 hours PRN Moderate Pain (4 - 6)  HYDROmorphone  Injectable 1 milliGRAM(s) IV Push every 3 hours PRN Severe Pain (7 - 10)  metoclopramide Injectable 10 milliGRAM(s) IV Push once PRN Nausea and/or Vomiting  ondansetron Injectable 4 milliGRAM(s) IV Push every 6 hours PRN Nausea      Vital Signs Last 24 Hrs  T(C): 37.1 (26 Jan 2019 08:24), Max: 37.3 (25 Jan 2019 17:00)  T(F): 98.7 (26 Jan 2019 08:24), Max: 99.1 (25 Jan 2019 17:00)  HR: 77 (26 Jan 2019 09:12) (77 - 91)  BP: 137/73 (26 Jan 2019 08:24) (100/64 - 137/73)  BP(mean): --  RR: 16 (26 Jan 2019 09:12) (15 - 17)  SpO2: 96% (26 Jan 2019 09:12) (92% - 97%)    PHYSICAL EXAM:      Constitutional: A&Ox3    Respiratory: non labored breathing, no respiratory distress    Cardiovascular: NSR, RRR    Gastrointestinal: soft, mild incisional tenderness, nondistended, incisions c/d/i    Extremities: (-) edema                  I&O's Detail    25 Jan 2019 07:01  -  26 Jan 2019 07:00  --------------------------------------------------------  IN:    lactated ringers.: 1250 mL    Oral Fluid: 240 mL  Total IN: 1490 mL    OUT:    Voided: 1300 mL  Total OUT: 1300 mL    Total NET: 190 mL      26 Jan 2019 07:01  -  26 Jan 2019 11:44  --------------------------------------------------------  IN:    lactated ringers.: 625 mL  Total IN: 625 mL    OUT:    Voided: 500 mL  Total OUT: 500 mL    Total NET: 125 mL          LABS:                        11.7   8.3   )-----------( 233      ( 26 Jan 2019 06:48 )             35.1     01-26    137  |  102  |  8   ----------------------------<  72  3.9   |  28  |  0.62    Ca    8.7      26 Jan 2019 06:48  Phos  2.8     01-26  Mg     1.9     01-26            RADIOLOGY & ADDITIONAL STUDIES:

## 2019-01-29 DIAGNOSIS — I10 ESSENTIAL (PRIMARY) HYPERTENSION: ICD-10-CM

## 2019-01-29 DIAGNOSIS — E66.01 MORBID (SEVERE) OBESITY DUE TO EXCESS CALORIES: ICD-10-CM

## 2019-01-29 DIAGNOSIS — G89.29 OTHER CHRONIC PAIN: ICD-10-CM

## 2019-01-29 DIAGNOSIS — E78.5 HYPERLIPIDEMIA, UNSPECIFIED: ICD-10-CM

## 2019-01-29 DIAGNOSIS — Z79.891 LONG TERM (CURRENT) USE OF OPIATE ANALGESIC: ICD-10-CM

## 2019-01-29 DIAGNOSIS — G47.33 OBSTRUCTIVE SLEEP APNEA (ADULT) (PEDIATRIC): ICD-10-CM

## 2019-01-29 PROBLEM — E78.00 PURE HYPERCHOLESTEROLEMIA, UNSPECIFIED: Chronic | Status: ACTIVE | Noted: 2019-01-23

## 2019-01-29 PROBLEM — G47.30 SLEEP APNEA, UNSPECIFIED: Chronic | Status: ACTIVE | Noted: 2019-01-24

## 2019-02-01 ENCOUNTER — OTHER (OUTPATIENT)
Age: 54
End: 2019-02-01

## 2019-02-01 ENCOUNTER — APPOINTMENT (OUTPATIENT)
Dept: SURGERY | Facility: CLINIC | Age: 54
End: 2019-02-01
Payer: MEDICARE

## 2019-02-01 VITALS
HEIGHT: 62 IN | BODY MASS INDEX: 35.19 KG/M2 | OXYGEN SATURATION: 98 % | TEMPERATURE: 97.6 F | WEIGHT: 191.25 LBS | SYSTOLIC BLOOD PRESSURE: 130 MMHG | HEART RATE: 73 BPM | DIASTOLIC BLOOD PRESSURE: 84 MMHG

## 2019-02-01 PROCEDURE — 99211 OFF/OP EST MAY X REQ PHY/QHP: CPT

## 2019-02-01 NOTE — HISTORY OF PRESENT ILLNESS
[de-identified] : Pt is a 54 y/o F one week s/p RYGB who presents today feeling well. Pt states she has lost 4 pounds since surgery. Pt states she is tolerating her diet with no problems. States she is consuming about 3 bottles of water daily. Reports that she is walking for exercise, informed pt that we recommend 3-5 x/week of exercise for 30 minute sessions. Informed pt exercise is very important in order to have good weight loss. States she is tolerating vitamins with no problems. Denies fevers, sob, chest pain.

## 2019-02-26 ENCOUNTER — APPOINTMENT (OUTPATIENT)
Dept: SURGERY | Facility: CLINIC | Age: 54
End: 2019-02-26
Payer: MEDICARE

## 2019-02-26 VITALS
SYSTOLIC BLOOD PRESSURE: 104 MMHG | BODY MASS INDEX: 33 KG/M2 | OXYGEN SATURATION: 98 % | HEIGHT: 62 IN | HEART RATE: 77 BPM | DIASTOLIC BLOOD PRESSURE: 71 MMHG | WEIGHT: 179.31 LBS | TEMPERATURE: 97.7 F

## 2019-02-26 DIAGNOSIS — Z86.39 PERSONAL HISTORY OF OTHER ENDOCRINE, NUTRITIONAL AND METABOLIC DISEASE: ICD-10-CM

## 2019-02-26 PROCEDURE — 99024 POSTOP FOLLOW-UP VISIT: CPT

## 2019-02-26 NOTE — HISTORY OF PRESENT ILLNESS
[de-identified] : Pt is a 53 y.o F 1 month s/p RYGB who presents today feeling well. Pt reports a 17 lb weight loss since surgery. States she is tolerating her diet at home with no problems, taking her vitamins as directed. Denies n,v,c,d. States that for exercise, she is walking 1 hour every day. No other concerns at this time.

## 2019-02-26 NOTE — PLAN
[FreeTextEntry1] : F/u in 1 week for wound check. Reviewed wound care with pt. If does not improve will send abx\par

## 2019-02-26 NOTE — PHYSICAL EXAM
[Normal] : affect appropriate [de-identified] : wound to R of umbilicus is red, without pus, open

## 2019-03-05 ENCOUNTER — APPOINTMENT (OUTPATIENT)
Dept: SURGERY | Facility: CLINIC | Age: 54
End: 2019-03-05
Payer: MEDICARE

## 2019-03-05 VITALS
SYSTOLIC BLOOD PRESSURE: 113 MMHG | BODY MASS INDEX: 32.62 KG/M2 | HEIGHT: 62 IN | WEIGHT: 177.25 LBS | DIASTOLIC BLOOD PRESSURE: 74 MMHG | HEART RATE: 90 BPM | TEMPERATURE: 97.9 F | OXYGEN SATURATION: 98 %

## 2019-03-05 DIAGNOSIS — I10 ESSENTIAL (PRIMARY) HYPERTENSION: ICD-10-CM

## 2019-03-05 PROCEDURE — 99024 POSTOP FOLLOW-UP VISIT: CPT

## 2019-03-05 NOTE — HISTORY OF PRESENT ILLNESS
[de-identified] : Pt is a 52 y/o F 1 month s/p RYGB who presents today feeling well, here for a wound checck. Pt states she has been cleaning her L lap wound daily. Denies discharge, redness, erythema. States she is feeling well, tolerating her diet with no problem, drinking 8 cups of water daily. Denies n,n,v,d. Reports 20 pound weight loss since surgery.

## 2019-09-17 ENCOUNTER — APPOINTMENT (OUTPATIENT)
Dept: SURGERY | Facility: CLINIC | Age: 54
End: 2019-09-17
Payer: MEDICARE

## 2019-09-17 VITALS
OXYGEN SATURATION: 99 % | TEMPERATURE: 96.6 F | HEIGHT: 62 IN | SYSTOLIC BLOOD PRESSURE: 107 MMHG | WEIGHT: 145 LBS | DIASTOLIC BLOOD PRESSURE: 73 MMHG | BODY MASS INDEX: 26.68 KG/M2 | HEART RATE: 64 BPM

## 2019-09-17 PROCEDURE — 99213 OFFICE O/P EST LOW 20 MIN: CPT

## 2019-09-17 NOTE — HISTORY OF PRESENT ILLNESS
[de-identified] : Pt is a 52 y/o F 6 mo s/p RYGB who presents today feeling well. Reports following her diet at home with no problems. States that she is walking for exercise, and lifting weights 3x/week. States that she is taking her vitamins at home, states she never received iron, will send today. Phoebe hunter,oziel,c,d. Reports a 200 lbs since surgery.

## 2019-10-02 PROBLEM — Z60.2 PERSON LIVING ALONE: Status: ACTIVE | Noted: 2017-12-07

## 2019-10-16 LAB
25(OH)D3 SERPL-MCNC: 34.9 NG/ML
A-TOCOPHEROL VIT E SERPL-MCNC: 9.7 MG/L
ALBUMIN SERPL ELPH-MCNC: 4.1 G/DL
ALP BLD-CCNC: 87 U/L
ALT SERPL-CCNC: 21 U/L
ANION GAP SERPL CALC-SCNC: 11 MMOL/L
AST SERPL-CCNC: 22 U/L
BASOPHILS # BLD AUTO: 0.03 K/UL
BASOPHILS NFR BLD AUTO: 0.5 %
BETA+GAMMA TOCOPHEROL SERPL-MCNC: 0.6 MG/L
BILIRUB SERPL-MCNC: 0.3 MG/DL
BUN SERPL-MCNC: 12 MG/DL
CA-I SERPL-SCNC: 1.23 MMOL/L
CALCIUM SERPL-MCNC: 9.2 MG/DL
CALCIUM SERPL-MCNC: 9.2 MG/DL
CHLORIDE SERPL-SCNC: 103 MMOL/L
CHOLEST SERPL-MCNC: 156 MG/DL
CHOLEST/HDLC SERPL: 2.8 RATIO
CO2 SERPL-SCNC: 25 MMOL/L
CREAT SERPL-MCNC: 0.5 MG/DL
EOSINOPHIL # BLD AUTO: 0.19 K/UL
EOSINOPHIL NFR BLD AUTO: 3 %
ESTIMATED AVERAGE GLUCOSE: 103 MG/DL
FOLATE SERPL-MCNC: >20 NG/ML
GLUCOSE SERPL-MCNC: 91 MG/DL
HBA1C MFR BLD HPLC: 5.2 %
HCT VFR BLD CALC: 45 %
HDLC SERPL-MCNC: 55 MG/DL
HGB BLD-MCNC: 14 G/DL
IMM GRANULOCYTES NFR BLD AUTO: 0.2 %
INR PPP: 1.01 RATIO
IRON SATN MFR SERPL: 34 %
IRON SERPL-MCNC: 104 UG/DL
LDLC SERPL CALC-MCNC: 74 MG/DL
LYMPHOCYTES # BLD AUTO: 2.13 K/UL
LYMPHOCYTES NFR BLD AUTO: 33.9 %
MAN DIFF?: NORMAL
MCHC RBC-ENTMCNC: 30.6 PG
MCHC RBC-ENTMCNC: 31.1 GM/DL
MCV RBC AUTO: 98.5 FL
MONOCYTES # BLD AUTO: 0.47 K/UL
MONOCYTES NFR BLD AUTO: 7.5 %
NEUTROPHILS # BLD AUTO: 3.46 K/UL
NEUTROPHILS NFR BLD AUTO: 54.9 %
PARATHYROID HORMONE INTACT: 130 PG/ML
PLATELET # BLD AUTO: 322 K/UL
POTASSIUM SERPL-SCNC: 4.8 MMOL/L
PREALB SERPL NEPH-MCNC: 22 MG/DL
PROT SERPL-MCNC: 6.9 G/DL
PT BLD: 11.6 SEC
RBC # BLD: 4.57 M/UL
RBC # FLD: 13 %
SODIUM SERPL-SCNC: 139 MMOL/L
TIBC SERPL-MCNC: 306 UG/DL
TRIGL SERPL-MCNC: 135 MG/DL
TSH SERPL-ACNC: 2.1 UIU/ML
UIBC SERPL-MCNC: 202 UG/DL
VIT A SERPL-MCNC: 37.6 UG/DL
VIT B1 SERPL-MCNC: 171.2 NMOL/L
VIT B12 SERPL-MCNC: 1537 PG/ML
WBC # FLD AUTO: 6.29 K/UL
ZINC SERPL-MCNC: 62 UG/DL

## 2019-11-26 RX ORDER — IRON PS COMPLEX/B12/FOLIC ACID 150-25-1
150-25-1 CAPSULE ORAL
Qty: 30 | Refills: 6 | Status: ACTIVE | COMMUNITY
Start: 2019-11-26 | End: 1900-01-01

## 2019-12-17 ENCOUNTER — APPOINTMENT (OUTPATIENT)
Dept: SURGERY | Facility: CLINIC | Age: 54
End: 2019-12-17
Payer: MEDICARE

## 2019-12-17 VITALS
OXYGEN SATURATION: 99 % | WEIGHT: 144.25 LBS | HEART RATE: 69 BPM | TEMPERATURE: 97.6 F | HEIGHT: 62 IN | BODY MASS INDEX: 26.54 KG/M2 | DIASTOLIC BLOOD PRESSURE: 83 MMHG | SYSTOLIC BLOOD PRESSURE: 132 MMHG

## 2019-12-17 PROCEDURE — 99213 OFFICE O/P EST LOW 20 MIN: CPT

## 2019-12-17 NOTE — ADDENDUM
[FreeTextEntry1] : Documented by González Brizuela acting as a scribe for Dr. Ned Gama on 12/17/2019\par

## 2019-12-17 NOTE — HISTORY OF PRESENT ILLNESS
[de-identified] : 53 y/o F s/p gastric bypass on 1/24/19, presents here today for follow up. She reports here feeling well. She does complain of body pains. Given this, she is unable to exercise much. She states that she has lost about ~56lbs since her surgery. She is tolerating her diet well but reports eating mostly soft and liquid foods such as rice with blended beans and vegetable soups. She admits to regurgitation with eating solids such as fish and chicken. She also reports having coffee with cookies occasionally. She states that she eats small amounts for her meals. She is compliant with vitamins and iron. She would like to review her blood work results in office. She reports going to the bathroom frequently, 3-4 times per day with the use of laxatives.

## 2019-12-17 NOTE — PLAN
[FreeTextEntry1] : See nutri\par Referred to endocrinologist\par Scheduled for EGD\par FU after above

## 2019-12-17 NOTE — ASSESSMENT
[FreeTextEntry1] : 55 y/o F s/p gastric bypass on 1/24/19. Patient is doing well. She has not been compliant with the recommended diet and eating foods not beneficial to her weight loss. She is instructed to avoid these foods. She is also instructed to avoid relying on laxatives to have a BM and to take fiber supplements instead and incorporate more fiber into her diet. She is also instructed to exercise 3-5x/week. Will see nutritionist for dietary adjustment. Blood work reviewed in office WNL except for an elevated parathyroid hormone. She has been referred to an endocrinologist for this and advised to make an appointment to further evaluate her. Given her intolerance to solid foods, will have her undergo an EGD to further evaluate her anatomy. To follow up here after to review results and determine a plan of care.\par

## 2019-12-17 NOTE — END OF VISIT
[FreeTextEntry3] : All medical record entries made by the Scribe were at my, Dr. Gama's direction and personally dictated by me on 12/17/2019  I have reviewed the chart and agree that the record accurately reflects my personal performance of the history, physical exam, assessment and plan. I have also personally directed, reviewed, and agreed with the chart.\par \par

## 2019-12-20 ENCOUNTER — APPOINTMENT (OUTPATIENT)
Dept: ENDOCRINOLOGY | Facility: CLINIC | Age: 54
End: 2019-12-20
Payer: MEDICARE

## 2019-12-20 VITALS
BODY MASS INDEX: 26.68 KG/M2 | DIASTOLIC BLOOD PRESSURE: 67 MMHG | WEIGHT: 145 LBS | HEIGHT: 62 IN | SYSTOLIC BLOOD PRESSURE: 108 MMHG | HEART RATE: 67 BPM

## 2019-12-20 DIAGNOSIS — Z13.820 ENCOUNTER FOR SCREENING FOR OSTEOPOROSIS: ICD-10-CM

## 2019-12-20 DIAGNOSIS — Z83.3 FAMILY HISTORY OF DIABETES MELLITUS: ICD-10-CM

## 2019-12-20 DIAGNOSIS — Z83.49 FAMILY HISTORY OF OTHER ENDOCRINE, NUTRITIONAL AND METABOLIC DISEASES: ICD-10-CM

## 2019-12-20 PROCEDURE — 99204 OFFICE O/P NEW MOD 45 MIN: CPT

## 2019-12-20 RX ORDER — DOCUSATE SODIUM 100 MG/1
100 CAPSULE ORAL TWICE DAILY
Qty: 8 | Refills: 0 | Status: DISCONTINUED | COMMUNITY
Start: 2019-02-25 | End: 2019-12-20

## 2019-12-20 RX ORDER — HYDROCODONE BITARTRATE AND ACETAMINOPHEN 7.5; 3 MG/1; MG/1
TABLET ORAL
Refills: 0 | Status: DISCONTINUED | COMMUNITY
End: 2019-12-20

## 2019-12-20 RX ORDER — IRON PS COMPLEX/B12/FOLIC ACID 150-25-1
150-25-1 CAPSULE ORAL
Qty: 30 | Refills: 6 | Status: DISCONTINUED | COMMUNITY
Start: 2019-09-17 | End: 2019-12-20

## 2019-12-20 RX ORDER — PREGABALIN 300 MG/1
CAPSULE ORAL
Refills: 0 | Status: DISCONTINUED | COMMUNITY
End: 2019-12-20

## 2019-12-20 RX ORDER — BACLOFEN 15 MG/1
TABLET ORAL
Refills: 0 | Status: DISCONTINUED | COMMUNITY
End: 2019-12-20

## 2019-12-20 RX ORDER — IRON ASPGLY,PS/C/B12/FA/CA/SUC 150-25-1
50-100 CAPSULE ORAL
Qty: 30 | Refills: 0 | Status: DISCONTINUED | COMMUNITY
Start: 2019-02-01 | End: 2019-12-20

## 2019-12-20 RX ORDER — IBUPROFEN 800 MG/1
TABLET, FILM COATED ORAL
Refills: 0 | Status: DISCONTINUED | COMMUNITY
End: 2019-12-20

## 2019-12-20 RX ORDER — ATORVASTATIN CALCIUM 80 MG/1
TABLET, FILM COATED ORAL
Refills: 0 | Status: DISCONTINUED | COMMUNITY
End: 2019-12-20

## 2019-12-20 RX ORDER — NAPROXEN SODIUM 550 MG/1
550 TABLET ORAL
Qty: 60 | Refills: 0 | Status: ACTIVE | COMMUNITY
Start: 2019-12-18

## 2019-12-20 RX ORDER — IRON PS COMPLEX/B12/FOLIC ACID 150-25-1
150-25-1 CAPSULE ORAL
Qty: 30 | Refills: 2 | Status: DISCONTINUED | COMMUNITY
Start: 2019-02-26 | End: 2019-12-20

## 2020-01-09 NOTE — ADDENDUM
[FreeTextEntry1] : Bone density from January 2020 significant for T-scores of -0.3 at the lumbar spine, -1.6 at the femoral neck, -0.9 at the total hip. Her 10 year fracture risk calculated by FRAX is 7.3% for major osteoporotic fracture and 0.7% for hip fracture, below the treatment thresholds. I spoke with Ms. Hauser about these reassuring results. 1/09/20

## 2020-01-09 NOTE — ASSESSMENT
[FreeTextEntry1] : Hyperparathyroidism. She has a history of gastric bypass. She was noted to have hyperparathyroidism with normal serum calcium on blood tests from September 2019. Vitamin D and renal function are within range. Her hyperparathyroidism is likely secondary to decreased calcium absorption; normocalcemic primary hyperparathyroidism remains on the differential diagnosis. We will optimize calcium and vitamin D supplementation. We can check transglutaminase antibodies next visit to exclude celiac disease. If PTH concentrations remain elevated, we will send a 24 hour urine calcium to exclude hypercalciuria. \par Calcium 1200 mg daily from diet and supplements (to be taken in divided doses as no more than 500-600 mg can be absorbed at one time); advised increased supplemental calcium\par Adjust vitamin D supplementation to 50,000 intl units every two weeks (average 3500 intl units daily)\par \par Screening for osteoporosis. Obtain baseline dual energy X-ray absorptiometry. \par \par CC:\par Dr. Stu Thompson, Fax [phone]

## 2020-01-09 NOTE — PHYSICAL EXAM
[Alert] : alert [No Acute Distress] : no acute distress [Healthy Appearance] : healthy appearance [Normal Sclera/Conjunctiva] : normal sclera/conjunctiva [Normal Oropharynx] : the oropharynx was normal [No Neck Mass] : no neck mass was observed [Supple] : the neck was supple [No LAD] : no lymphadenopathy [Thyroid Not Enlarged] : the thyroid was not enlarged [No Thyroid Nodules] : there were no palpable thyroid nodules [Normal Rate and Effort] : normal respiratory rhythm and effort [Clear to Auscultation] : lungs were clear to auscultation bilaterally [Normal Rate] : heart rate was normal  [Normal S1, S2] : normal S1 and S2 [Regular Rhythm] : with a regular rhythm [No Stigmata of Cushings Syndrome] : no stigmata of cushings syndrome [Normal Gait] : normal gait [Normal Insight/Judgement] : insight and judgment were intact [Kyphosis] : no kyphosis present [de-identified] : no moon facies, no supraclavicular fat pads [Acanthosis Nigricans] : no acanthosis nigricans

## 2020-01-09 NOTE — HISTORY OF PRESENT ILLNESS
[FreeTextEntry1] : Ms. Hauser is a 54 year-old woman with history of gastric bypass in January 2019 presenting for evaluation of hyperparathyroidism.\par \par Hyperparathyroidism. \par She was noted to have hyperparathyroidism with normal serum calcium on blood tests from September 2019, significant for calcium 9.2 mg/dL (albumin 4.1 g/dL),  pg/mL (normal: 15-65), 25-hydroxyvitamin D 34.9 ng/mL, BUN/creatinine 12/0.50 mg/dL (eGFR 109 mL/in).\par She has a glass of milk most days. She has occasional yogurt and cheese. She is taking calcium 500 mg daily. She is taking vitamin D 2500 intl units daily. \par No history of kidney stones. \par She has a history of right foot fracture after twisting her foot; no other fracture. No history of bone density testing. \par No history of radiation exposure other than medical imaging. No history of hydrochlorothiazide or lithium use. \par Father and daughter with history of thyroid disease. Father with history of type 2 diabetes mellitus. No family history of calcium disorders or endocrine tumors. \par \par She has a history of fatigue, dizziness, body pain.

## 2020-03-06 ENCOUNTER — APPOINTMENT (OUTPATIENT)
Dept: ENDOCRINOLOGY | Facility: CLINIC | Age: 55
End: 2020-03-06

## 2020-05-05 NOTE — ED PROVIDER NOTE - CPE EDP [facility] NORM
Pt presented with dry teaspoon - refused to open oral cavity - noted moaning with wet/gurgly vocal quality - - -

## 2020-05-08 ENCOUNTER — APPOINTMENT (OUTPATIENT)
Dept: SURGERY | Facility: CLINIC | Age: 55
End: 2020-05-08
Payer: MEDICARE

## 2020-05-08 VITALS — HEIGHT: 62 IN | BODY MASS INDEX: 26.31 KG/M2 | WEIGHT: 143 LBS

## 2020-05-08 DIAGNOSIS — E66.01 MORBID (SEVERE) OBESITY DUE TO EXCESS CALORIES: ICD-10-CM

## 2020-05-08 DIAGNOSIS — Z98.84 BARIATRIC SURGERY STATUS: ICD-10-CM

## 2020-05-08 DIAGNOSIS — E78.00 PURE HYPERCHOLESTEROLEMIA, UNSPECIFIED: ICD-10-CM

## 2020-05-08 PROCEDURE — 99213 OFFICE O/P EST LOW 20 MIN: CPT | Mod: 95

## 2020-05-08 NOTE — HISTORY OF PRESENT ILLNESS
[de-identified] : 54 yr old  female  s/p sleeve 4 months ago  being seen via telehealth feeling well but  complaining  with  gas and not very compliant  with her diet or exercise regimen

## 2020-07-31 ENCOUNTER — APPOINTMENT (OUTPATIENT)
Dept: ENDOCRINOLOGY | Facility: CLINIC | Age: 55
End: 2020-07-31
Payer: MEDICARE

## 2020-07-31 DIAGNOSIS — E21.3 HYPERPARATHYROIDISM, UNSPECIFIED: ICD-10-CM

## 2020-07-31 PROCEDURE — 99214 OFFICE O/P EST MOD 30 MIN: CPT | Mod: 95

## 2020-07-31 RX ORDER — CHOLECALCIFEROL (VITAMIN D3) 1250 MCG
1.25 MG CAPSULE ORAL
Qty: 6 | Refills: 3 | Status: ACTIVE | COMMUNITY
Start: 2019-12-20 | End: 1900-01-01

## 2020-07-31 NOTE — HISTORY OF PRESENT ILLNESS
[Home] : at home, [unfilled] , at the time of the visit. [Medical Office: ([facility])___] : at the medical office located in  [Verbal consent obtained from patient] : the patient, [unfilled] [FreeTextEntry1] : Ms. Hauser is a 54 year-old woman with history of gastric bypass in January 2019 presenting for follow-up of hyperparathyroidism. I saw her for an initial visit in December 2019.\par \par Hyperparathyroidism. \par She was noted to have hyperparathyroidism with normal serum calcium on blood tests from September 2019, significant for calcium 9.2 mg/dL (albumin 4.1 g/dL),  pg/mL (normal: 15-65), 25-hydroxyvitamin D 34.9 ng/mL, BUN/creatinine 12/0.50 mg/dL (eGFR 109 mL/in).\par She has a glass of milk daily. She has yogurt or cheese most days. She is taking calcium 500 mg daily. She is taking vitamin D3 50,000 intl units every two weeks. \par No history of kidney stones. \par She has a history of right foot fracture after twisting her foot; no other fracture. No history of bone density testing. \par No history of radiation exposure other than medical imaging. No history of hydrochlorothiazide or lithium use. \par Father and daughter with history of thyroid disease. Father with history of type 2 diabetes mellitus. No family history of calcium disorders or endocrine tumors. \par \par Interim History \par Last visit we adjusted vitamin D supplementation to vitamin D3 50,000 intl units every two weeks. \par Bone density from January 2020 significant for T-scores of -0.3 at the lumbar spine, -1.6 at the femoral neck, -0.9 at the total hip. Her 10 year fracture risk calculated by FRAX is 7.3% for major osteoporotic fracture and 0.7% for hip fracture, below the treatment thresholds.\par She has a history of fatigue, dizziness, body pain. \par Medical and surgical history, medications, allergies, social and family history reviewed and updated as needed.

## 2020-07-31 NOTE — DATA REVIEWED
[FreeTextEntry1] : Most recent bone mineral density\par Date: January 2, 2020\par Source: [facility]\par Site: [facility]\par \par Site	BMD (g/cm2)	T-score	Change previous	Change baseline	\par Lumbar spine	1.039	-0.3\par Femoral neck	0.810	-1.6 (right)	\par Total hip	                0.892       -0.9 (right)         \par Distal radius           	                \par DXA Comments:

## 2020-07-31 NOTE — ASSESSMENT
[FreeTextEntry1] : Hyperparathyroidism. She has a history of gastric bypass. She was noted to have hyperparathyroidism with normal serum calcium on blood tests from September 2019. Vitamin D and renal function were within range. Her hyperparathyroidism is likely secondary to decreased calcium absorption; normocalcemic primary hyperparathyroidism remains on the differential diagnosis. We will optimize calcium and vitamin D supplementation. We can check transglutaminase antibodies next visit to exclude celiac disease. If PTH concentrations remain elevated, we will send a 24 hour urine calcium to exclude hypercalciuria. \par Check calciotropic panel and transglutaminase antibodies\par Calcium 1200 mg daily from diet and supplements (to be taken in divided doses as no more than 500-600 mg can be absorbed at one time); continue current regimen\par Continue vitamin D3 50,000 intl units every two weeks\par \par Email: [email or web address]\par \par CC:\par Dr. Stu Thompson, Fax [phone]

## 2025-04-30 NOTE — DISCHARGE NOTE ADULT - CLICK TO LAUNCH ORM
Problem: Discharge Planning  Goal: Discharge to home or other facility with appropriate resources  Outcome: Progressing     Problem: Pain  Goal: Verbalizes/displays adequate comfort level or baseline comfort level  Outcome: Progressing     Problem: ABCDS Injury Assessment  Goal: Absence of physical injury  Outcome: Progressing  Flowsheets (Taken 4/29/2025 2220)  Absence of Physical Injury: Implement safety measures based on patient assessment      .